# Patient Record
Sex: MALE | URBAN - METROPOLITAN AREA
[De-identification: names, ages, dates, MRNs, and addresses within clinical notes are randomized per-mention and may not be internally consistent; named-entity substitution may affect disease eponyms.]

---

## 2017-01-06 ENCOUNTER — ANESTHESIA EVENT (OUTPATIENT)
Dept: SURGERY | Facility: CLINIC | Age: 67
End: 2017-01-06
Payer: COMMERCIAL

## 2017-01-06 ENCOUNTER — ANESTHESIA (OUTPATIENT)
Dept: SURGERY | Facility: CLINIC | Age: 67
End: 2017-01-06
Payer: COMMERCIAL

## 2017-01-06 ENCOUNTER — APPOINTMENT (OUTPATIENT)
Dept: SURGERY | Facility: PHYSICIAN GROUP | Age: 67
End: 2017-01-06
Payer: COMMERCIAL

## 2017-01-06 PROCEDURE — 49568 ZZHC REPAIR HERNIA WITH MESH: CPT | Performed by: SURGERY

## 2017-01-06 PROCEDURE — 49560 ZZHC REPAIR INCISIONAL HERNIA,REDUCIBLE: CPT | Performed by: SURGERY

## 2017-01-06 PROCEDURE — 25000125 ZZHC RX 250: Performed by: NURSE ANESTHETIST, CERTIFIED REGISTERED

## 2017-01-06 PROCEDURE — 25000125 ZZHC RX 250: Performed by: SURGERY

## 2017-01-06 PROCEDURE — 25800025 ZZH RX 258: Performed by: NURSE ANESTHETIST, CERTIFIED REGISTERED

## 2017-01-06 RX ORDER — PROPOFOL 10 MG/ML
INJECTION, EMULSION INTRAVENOUS PRN
Status: DISCONTINUED | OUTPATIENT
Start: 2017-01-06 | End: 2017-01-06

## 2017-01-06 RX ORDER — SODIUM CHLORIDE, SODIUM LACTATE, POTASSIUM CHLORIDE, CALCIUM CHLORIDE 600; 310; 30; 20 MG/100ML; MG/100ML; MG/100ML; MG/100ML
INJECTION, SOLUTION INTRAVENOUS CONTINUOUS PRN
Status: DISCONTINUED | OUTPATIENT
Start: 2017-01-06 | End: 2017-01-06

## 2017-01-06 RX ORDER — FENTANYL CITRATE 50 UG/ML
INJECTION, SOLUTION INTRAMUSCULAR; INTRAVENOUS PRN
Status: DISCONTINUED | OUTPATIENT
Start: 2017-01-06 | End: 2017-01-06

## 2017-01-06 RX ORDER — ONDANSETRON 2 MG/ML
INJECTION INTRAMUSCULAR; INTRAVENOUS PRN
Status: DISCONTINUED | OUTPATIENT
Start: 2017-01-06 | End: 2017-01-06

## 2017-01-06 RX ORDER — KETOROLAC TROMETHAMINE 30 MG/ML
INJECTION, SOLUTION INTRAMUSCULAR; INTRAVENOUS PRN
Status: DISCONTINUED | OUTPATIENT
Start: 2017-01-06 | End: 2017-01-06

## 2017-01-06 RX ORDER — NEOSTIGMINE METHYLSULFATE 1 MG/ML
VIAL (ML) INJECTION PRN
Status: DISCONTINUED | OUTPATIENT
Start: 2017-01-06 | End: 2017-01-06

## 2017-01-06 RX ORDER — DEXAMETHASONE SODIUM PHOSPHATE 4 MG/ML
INJECTION, SOLUTION INTRA-ARTICULAR; INTRALESIONAL; INTRAMUSCULAR; INTRAVENOUS; SOFT TISSUE PRN
Status: DISCONTINUED | OUTPATIENT
Start: 2017-01-06 | End: 2017-01-06

## 2017-01-06 RX ORDER — LIDOCAINE HYDROCHLORIDE 10 MG/ML
INJECTION, SOLUTION INFILTRATION; PERINEURAL PRN
Status: DISCONTINUED | OUTPATIENT
Start: 2017-01-06 | End: 2017-01-06

## 2017-01-06 RX ORDER — GLYCOPYRROLATE 0.2 MG/ML
INJECTION, SOLUTION INTRAMUSCULAR; INTRAVENOUS PRN
Status: DISCONTINUED | OUTPATIENT
Start: 2017-01-06 | End: 2017-01-06

## 2017-01-06 RX ADMIN — GLYCOPYRROLATE 0.3 MG: 0.2 INJECTION, SOLUTION INTRAMUSCULAR; INTRAVENOUS at 13:01

## 2017-01-06 RX ADMIN — SODIUM CHLORIDE, POTASSIUM CHLORIDE, SODIUM LACTATE AND CALCIUM CHLORIDE: 600; 310; 30; 20 INJECTION, SOLUTION INTRAVENOUS at 12:05

## 2017-01-06 RX ADMIN — ROCURONIUM BROMIDE 40 MG: 10 INJECTION INTRAVENOUS at 12:12

## 2017-01-06 RX ADMIN — Medication 2 MG: at 13:01

## 2017-01-06 RX ADMIN — PROPOFOL 200 MG: 10 INJECTION, EMULSION INTRAVENOUS at 12:12

## 2017-01-06 RX ADMIN — FENTANYL CITRATE 150 MCG: 50 INJECTION, SOLUTION INTRAMUSCULAR; INTRAVENOUS at 12:12

## 2017-01-06 RX ADMIN — ONDANSETRON 4 MG: 2 INJECTION INTRAMUSCULAR; INTRAVENOUS at 13:01

## 2017-01-06 RX ADMIN — KETOROLAC TROMETHAMINE 30 MG: 30 INJECTION, SOLUTION INTRAMUSCULAR at 13:01

## 2017-01-06 RX ADMIN — MIDAZOLAM HYDROCHLORIDE 2 MG: 1 INJECTION, SOLUTION INTRAMUSCULAR; INTRAVENOUS at 12:07

## 2017-01-06 RX ADMIN — DEXAMETHASONE SODIUM PHOSPHATE 4 MG: 4 INJECTION, SOLUTION INTRAMUSCULAR; INTRAVENOUS at 12:12

## 2017-01-06 RX ADMIN — LIDOCAINE HYDROCHLORIDE 30 MG: 10 INJECTION, SOLUTION INFILTRATION; PERINEURAL at 12:12

## 2017-01-06 RX ADMIN — CEFAZOLIN SODIUM 2 G: 2 INJECTION, SOLUTION INTRAVENOUS at 12:05

## 2017-01-06 NOTE — ANESTHESIA POSTPROCEDURE EVALUATION
Patient: Facundo Diaz    HERNIORRHAPHY VENTRAL (N/A Abdomen)  Additional InformationProcedure(s):  Ventral Hernia Repair with mesh  - Wound Class: I-Clean    Diagnosis:Ventral Hernia   Diagnosis Additional Information: Pre-operative diagnosis:   Ventral Hernia    Post-operative diagnosis:  same   Procedure:  Ventral Herniorrhaphy with Mesh             Anesthesia Type:  General, ETT    Note:  Anesthesia Post Evaluation    Patient location during evaluation: PACU  Patient participation: Able to fully participate in evaluation  Level of consciousness: awake and alert  Pain management: adequate  Airway patency: patent  Cardiovascular status: acceptable  Respiratory status: acceptable  Hydration status: acceptable  PONV: none     Anesthetic complications: None          Last vitals:  Filed Vitals:    01/06/17 1345 01/06/17 1357 01/06/17 1410   BP: 114/67 115/69 134/72   Pulse:      Temp:  97.3  F (36.3  C)    Resp: 13 12 16   SpO2: 98% 96% 94%       Electronically Signed By: Obi Saini MD  January 6, 2017  2:16 PM

## 2017-01-06 NOTE — ANESTHESIA CARE TRANSFER NOTE
Patient: Facundo Diaz    HERNIORRHAPHY VENTRAL (N/A Abdomen)  Additional InformationProcedure(s):  Ventral Hernia Repair with mesh  - Wound Class: I-Clean    Diagnosis: Ventral Hernia   Diagnosis Additional Information: No value filed.    Anesthesia Type:   General, ETT     Note:  Airway :Face Mask  Patient transferred to:PACU  Comments: To PACU, Awake, VSS, SV, O2, Report to RN      Vitals: (Last set prior to Anesthesia Care Transfer)              Electronically Signed By: Dean Dennis Severson, APRN CRNA  January 6, 2017  1:12 PM

## 2017-01-06 NOTE — ANESTHESIA PREPROCEDURE EVALUATION
Anesthesia Evaluation     . Pt has had prior anesthetic. Type: General    No history of anesthetic complications     ROS/MED HX    ENT/Pulmonary:  - neg pulmonary ROS     Neurologic:  - neg neurologic ROS     Cardiovascular:  - neg cardiovascular ROS       METS/Exercise Tolerance:     Hematologic: Comments: Lab Test        12/05/16                       0703          HGB          14.7           Lab Test        12/05/16                       0703          NA           140           POTASSIUM    4.1           CHLORIDE     105           CO2          29            BUN          17            CR           1.23          ANIONGAP     6             YVON          9.0           GLC          101*                    Musculoskeletal:  - neg musculoskeletal ROS       GI/Hepatic:     (+) GERD Symptomatic,       Renal/Genitourinary:  - ROS Renal section negative       Endo:  - neg endo ROS       Psychiatric:  - neg psychiatric ROS       Infectious Disease:  - neg infectious disease ROS       Malignancy:         Other:    - neg other ROS           Physical Exam  Normal systems: cardiovascular, pulmonary and dental    Airway   Mallampati: II  TM distance: >3 FB  Neck ROM: full    Dental     Cardiovascular       Pulmonary                     Anesthesia Plan      History & Physical Review  History and physical reviewed and following examination; no interval change.    ASA Status:  2 .    NPO Status:  > 8 hours    Plan for General and ETT with Intravenous induction. Maintenance will be Balanced.    PONV prophylaxis:  Ondansetron (or other 5HT-3) and Dexamethasone or Solumedrol       Postoperative Care  Postoperative pain management:  IV analgesics and Oral pain medications.      Consents  Anesthetic plan, risks, benefits and alternatives discussed with:  Patient..                          .

## 2017-02-08 ENCOUNTER — OFFICE VISIT (OUTPATIENT)
Dept: SURGERY | Facility: CLINIC | Age: 67
End: 2017-02-08
Payer: COMMERCIAL

## 2017-02-08 VITALS
SYSTOLIC BLOOD PRESSURE: 122 MMHG | OXYGEN SATURATION: 96 % | HEIGHT: 72 IN | TEMPERATURE: 97.7 F | BODY MASS INDEX: 26.41 KG/M2 | HEART RATE: 91 BPM | WEIGHT: 195 LBS | DIASTOLIC BLOOD PRESSURE: 82 MMHG

## 2017-02-08 DIAGNOSIS — Z09 SURGICAL FOLLOWUP VISIT: Primary | ICD-10-CM

## 2017-02-08 PROCEDURE — 99024 POSTOP FOLLOW-UP VISIT: CPT | Performed by: PHYSICIAN ASSISTANT

## 2017-02-08 NOTE — PROGRESS NOTES
2/8/2017    [facility] Note     Subjective:  Facundo Diaz is here for his first postoperative visit. He underwent a ventral hernia repair with mesh by Dr. Burgess on 1/6/2017. Today he  tells me he has been feeling well since surgery. He currently does not require narcotic pain medications, he is eating a normal diet and his bowels are regular. He has no concerns today.    Objective:  Abd - soft, non-tender  Inc - Healing well, well approximated and without signs of infection    Assessment:  S/p ventral hernia repair with mesh.     Plan:  Advised to return to physical activity slowly  RTC PRN      Lucy Albarado PA-C      Please route or send letter to:  Primary Care Provider (PCP)

## 2017-02-08 NOTE — LETTER
2017     [facility] Note     RE: Facundo Diaz-:  [birth year]     Subjective:  Facundo Diaz is here for his first postoperative visit. He underwent a ventral hernia repair with mesh by Dr. Burgess on 2017. Today he tells me he has been feeling well since surgery. He currently does not require narcotic pain medications, he is eating a normal diet and his bowels are regular. He has no concerns today.     Objective:  Abd - soft, non-tender  Inc - Healing well, well approximated and without signs of infection     Assessment:  S/p ventral hernia repair with mesh.      Plan:  Advised to return to physical activity slowly  RTC PRN      Lucy Albarado PA-C

## 2017-02-15 ENCOUNTER — TRANSFERRED RECORDS (OUTPATIENT)
Dept: HEALTH INFORMATION MANAGEMENT | Facility: CLINIC | Age: 67
End: 2017-02-15

## 2017-05-05 DIAGNOSIS — E78.5 HYPERLIPIDEMIA LDL GOAL <160: ICD-10-CM

## 2017-05-05 LAB
ALBUMIN SERPL-MCNC: 3.9 G/DL (ref 3.4–5)
ALP SERPL-CCNC: 51 U/L (ref 40–150)
ALT SERPL W P-5'-P-CCNC: 19 U/L (ref 0–70)
AST SERPL W P-5'-P-CCNC: 19 U/L (ref 0–45)
BILIRUB DIRECT SERPL-MCNC: 0.1 MG/DL (ref 0–0.2)
BILIRUB SERPL-MCNC: 0.7 MG/DL (ref 0.2–1.3)
CHOLEST SERPL-MCNC: 232 MG/DL
HDLC SERPL-MCNC: 41 MG/DL
LDLC SERPL CALC-MCNC: 157 MG/DL
NONHDLC SERPL-MCNC: 191 MG/DL
PROT SERPL-MCNC: 7.9 G/DL (ref 6.8–8.8)
TRIGL SERPL-MCNC: 172 MG/DL

## 2017-05-05 PROCEDURE — 36415 COLL VENOUS BLD VENIPUNCTURE: CPT | Performed by: INTERNAL MEDICINE

## 2017-05-05 PROCEDURE — 80076 HEPATIC FUNCTION PANEL: CPT | Performed by: INTERNAL MEDICINE

## 2017-05-05 PROCEDURE — 80061 LIPID PANEL: CPT | Performed by: INTERNAL MEDICINE

## 2017-05-10 ENCOUNTER — OFFICE VISIT (OUTPATIENT)
Dept: INTERNAL MEDICINE | Facility: CLINIC | Age: 67
End: 2017-05-10
Payer: COMMERCIAL

## 2017-05-10 VITALS
WEIGHT: 193.9 LBS | HEIGHT: 72 IN | DIASTOLIC BLOOD PRESSURE: 68 MMHG | OXYGEN SATURATION: 96 % | HEART RATE: 78 BPM | RESPIRATION RATE: 18 BRPM | SYSTOLIC BLOOD PRESSURE: 110 MMHG | TEMPERATURE: 98.5 F | BODY MASS INDEX: 26.26 KG/M2

## 2017-05-10 DIAGNOSIS — Z23 NEED FOR VACCINATION: ICD-10-CM

## 2017-05-10 DIAGNOSIS — M42.00 SCHEURMANN'S DISEASE: ICD-10-CM

## 2017-05-10 DIAGNOSIS — E78.5 HYPERLIPIDEMIA LDL GOAL <160: Primary | ICD-10-CM

## 2017-05-10 DIAGNOSIS — N52.9 ERECTILE DYSFUNCTION, UNSPECIFIED ERECTILE DYSFUNCTION TYPE: ICD-10-CM

## 2017-05-10 DIAGNOSIS — Z12.5 SPECIAL SCREENING FOR MALIGNANT NEOPLASM OF PROSTATE: ICD-10-CM

## 2017-05-10 PROCEDURE — 90471 IMMUNIZATION ADMIN: CPT | Performed by: INTERNAL MEDICINE

## 2017-05-10 PROCEDURE — 99214 OFFICE O/P EST MOD 30 MIN: CPT | Mod: 25 | Performed by: INTERNAL MEDICINE

## 2017-05-10 PROCEDURE — 90670 PCV13 VACCINE IM: CPT | Performed by: INTERNAL MEDICINE

## 2017-05-10 PROCEDURE — G0009 ADMIN PNEUMOCOCCAL VACCINE: HCPCS | Mod: 59 | Performed by: INTERNAL MEDICINE

## 2017-05-10 PROCEDURE — 90714 TD VACC NO PRESV 7 YRS+ IM: CPT | Performed by: INTERNAL MEDICINE

## 2017-05-10 RX ORDER — SIMVASTATIN 20 MG
20 TABLET ORAL AT BEDTIME
Qty: 90 TABLET | Refills: 3 | Status: SHIPPED | OUTPATIENT
Start: 2017-05-10

## 2017-05-10 RX ORDER — SILDENAFIL 100 MG/1
50-100 TABLET, FILM COATED ORAL DAILY PRN
Qty: 12 TABLET | Refills: 11 | Status: SHIPPED | OUTPATIENT
Start: 2017-05-10

## 2017-05-10 NOTE — PROGRESS NOTES
SUBJECTIVE:                                                    Facundo Diaz is a 66 year old male who presents to clinic today for the following health issues:    Prevnar- DUE  TD- DUE    Back Pain      Duration:         Specific cause: none    Description:   Location of pain: low back left, middle of back left and neck both  Character of pain: dull ache  Pain radiation:none  New numbness or weakness in legs, not attributed to pain:  no     Intensity: Currently 0/10 but can be 4/10 sometimes    History:   Pain interferes with job: YES, hunches over alot  History of back problems: scheurmans disease  Any previous MRI or X-rays: None  Sees a specialist for back pain:  No  Therapies tried without relief: exercise    Alleviating factors:     Improved by: ibuprofen     Precipitating factors:  Worsened by: Standing, hunching over doing his job    Functional and Psychosocial Screen (Leonarda STarT Back):      Not performed today       Accompanying Signs & Symptoms:  Risk of Fracture:  None  Risk of Cauda Equina:  None  Risk of Infection:  None  Risk of Cancer:  None  Risk of Ankylosing Spondylitis:  Onset at age <35, male, AND morning back stiffness. no                      Problem list and histories reviewed & adjusted, as indicated.  Additional history: as documented    Patient Active Problem List   Diagnosis     Hyperlipidemia LDL goal <160     Erectile dysfunction     Scheurmann's disease     Umbilical hernia     Gastroesophageal reflux disease without esophagitis     Past Surgical History:   Procedure Laterality Date     HERNIORRHAPHY VENTRAL N/A 1/6/2017    Procedure: HERNIORRHAPHY VENTRAL;  Surgeon: Parker Burgess MD;  Location: [facility] OR       Social History   Substance Use Topics     Smoking status: Never Smoker     Smokeless tobacco: Not on file     Alcohol use Yes      Comment: Social Drinker      Family History   Problem Relation Age of Onset     CANCER Mother 84     Lipids Mother      CANCER Father 90      Pancreas     Musculoskeletal Disorder Father      back issues         Current Outpatient Prescriptions   Medication Sig Dispense Refill     oxyCODONE (ROXICODONE) 5 MG IR tablet Take 1-2 tablets (5-10 mg) by mouth every 3 hours as needed for pain or other (Moderate to Severe) 30 tablet 0     Sildenafil Citrate (VIAGRA PO)        Multiple Vitamin (MULTIVITAMINS PO)        No Known Allergies  BP Readings from Last 3 Encounters:   02/08/17 122/82   01/06/17 130/65   12/30/16 124/78    Wt Readings from Last 3 Encounters:   02/08/17 195 lb (88.5 kg)   01/06/17 195 lb 1.7 oz (88.5 kg)   12/30/16 198 lb (89.8 kg)            Reviewed and updated as needed this visit by clinical staff  Tobacco  Allergies  Med Hx  Surg Hx  Fam Hx  Soc Hx      Reviewed and updated as needed this visit by Provider       ROS:  C: NEGATIVE for fever, chills, change in weight  E/M: NEGATIVE for ear, mouth and throat problems  R: NEGATIVE for significant cough or SOB  CV: NEGATIVE for chest pain, palpitations or peripheral edema  GI: NEGATIVE for nausea, abdominal pain, heartburn, or change in bowel habits  : NEGATIVE for frequency, dysuria, or hematuria  H: NEGATIVE for bleeding problems  P: NEGATIVE for changes in mood or affect    OBJECTIVE:                                                    /68  Pulse 78  Temp 98.5  F (36.9  C) (Oral)  Resp 18  Ht 6' (1.829 m)  Wt 193 lb 14.4 oz (88 kg)  SpO2 96%  BMI 26.3 kg/m2  Body mass index is 26.3 kg/(m^2).  GENERAL: healthy, alert and no distress  EYES: Eyes grossly normal to inspection, extraocular movements - intact, and PERRL  HENT: ear canals- normal; TMs- normal; Nose- normal; Mouth- no ulcers, no lesions  NECK: no tenderness, no adenopathy, no asymmetry, no masses, no stiffness; thyroid- normal to palpation  RESP: lungs clear to auscultation - no rales, no rhonchi, no wheezes  CV: regular rates and rhythm, normal S1 S2, no S3 or S4 and no click or rub   MS: extremities- no gross  deformities noted, no edema  NEURO: no focal changes  BACK: no CVA tenderness, mild paralumbar tenderness  PSYCH: Alert and oriented times 3; speech- coherent , normal rate and volume; able to articulate logical thoughts, able to abstract reason, no tangential thoughts, no hallucinations or delusions, affect- normal       ASSESSMENT/PLAN:                                                      (E78.5) Hyperlipidemia LDL goal <160  (primary encounter diagnosis)  Comment: offered to start therapy  Plan: simvastatin (ZOCOR) 20 MG tablet, Hepatic         panel, Lipid Profile        After discussion of the treatment of hyperlipidemia, a statin-drug is chosen; prescription for Zocor once daily is written. The patient is informed that this type of drug is usually highly effective to lower LDL cholesterol and is usually very well tolerated. However, statin-type drugs can potentially injure the liver. Blood tests will be required every 3 months for the first 6 months of therapy, and at 6-12 month intervals thereafter.  Damage to the liver, if detected early, can be reversed by stopping the drug.  Be alert for persistent nausea, abdominal pain, or yellow jaundice, and report such to me directly. Statin drugs may also cause skeletal muscle injury in rare cases. Be alert for pronounced persistent diffuse muscle pain and discontinue the drug immediately should such symptoms develop. Grapefruit juice may increase the blood levels and side effects of some HMG Co-A reductase inhibitors (Zocor, Lipitor and Mevacor but not Pravachol or Lescol). Patient is counseled in this regard.      (M42.00) Scheurmann's disease  Comment: start PT consider referral  Plan: [facility], AND CHIROPRACTIC REFERRAL,         ORTHOPEDICS ADULT REFERRAL            (Z23) Need for vaccination  Comment: updated  Plan: C TD PRSERV FREE >=7 YRS ADS IM, PNEUMOCOCCAL         CONJ VACCINE 13 VALENT IM, VACCINE         ADMINISTRATION, INITIAL            (Z12.5)  Special screening for malignant neoplasm of prostate  Comment: as ordered repeat in 3 months screening  Plan: PSA, screen            (N52.9) Erectile dysfunction, unspecified erectile dysfunction type  Comment: start therapy  Plan: sildenafil (VIAGRA) 100 MG cap/tab        The patient desires Viagra to treat his erectile dysfunction. History and physical exam has not disclosed any obvious treatable cause of this complaint. He is informed that Viagra is usually not covered by insurance. It is available on a fee-for-service cost basis, and is relatively expensive. He can start with 50 mg dose, and increase to 100 mg if necessary. The method of use 1 hour prior to anticipated intercourse is explained. He should not use any more than one tablet in a 24 hour period. The side effects of possible headache, flushing, dyspepsia and transient changes in vision have been explained. Samples are not given.    The patient is not taking nitrates, and denies he has access to nitrates in any form at any time. I have counseled him that taking Viagra with nitrates of any form can cause death. Additionally, Viagra serum concentrations can be increased by the following: cimetidine, erythromycin, itraconazole or ketoconazole. This patient does not take these drugs, but I have counseled him to avoid Viagra if he does take any of these.    We have also discussed the fact that there have been some deaths in patients after taking Viagra, felt due to the exertion of intercourse rather than the drug itself. The patient is aware of this, and accepts whatever unknown degree of risk there is in this aspect.    See Patient Instructions    Emery Mallory MD  [facility]    25 minutes spent with this patient, face to face, discussing treatment options for listed problems above as well as side effects of appropriate medications.  Counseling time extended beyond 50% of the clinic visit.  Medication dosing, treatment plan and  follow-up were discussed. Also reviewed need for primary care testing for patient.

## 2017-05-10 NOTE — MR AVS SNAPSHOT
After Visit Summary   5/10/2017    Facundo Diaz    MRN: [id number]           Patient Information     Date Of Birth          1950        Visit Information        Provider Department      5/10/2017 1:40 PM Emery Mallory MD [facility]        Today's Diagnoses     Hyperlipidemia LDL goal <160    -  1    Scheurmann's disease        Need for vaccination        Special screening for malignant neoplasm of prostate        Erectile dysfunction, unspecified erectile dysfunction type           Follow-ups after your visit        Additional Services     [facility] REFERRAL       **This order will print in the [facility] Scheduling Office**    Physical Therapy, Hand Therapy and Chiropractic Care are available through:    *[facility]  *[facility]  *[facility]    Call one number to schedule at any of the above locations: [phone].    Your provider has referred you to: Physical Therapy at [facility] or [facility] [place] [place]    Indication/Reason for Referral:  Back Pain  Onset of Illness: weeks+  Therapy Orders: Evaluate and Treat  Special Programs: None  Special Request: None    Leonarda Brower      Additional Comments for the Therapist or Chiropractor:     Please be aware that coverage of these services is subject to the terms and limitations of your health insurance plan.  Call member services at your health plan with any benefit or coverage questions.      Please bring the following to your appointment:    *Your personal calendar for scheduling future appointments  *Comfortable clothing            ORTHOPEDICS ADULT REFERRAL       Your provider has referred you to: [facility] [place] [phone]   [email or web address]      Please be aware that coverage of these services is subject to the terms and limitations of your health insurance plan.  Call member services at your health plan with any  benefit or coverage questions.      Please bring the following to your appointment:    >>   Any x-rays, CTs or MRIs which have been performed.  Contact the facility where they were done to arrange for  prior to your scheduled appointment.    >>   List of current medications   >>   This referral request   >>   Any documents/labs given to you for this referral                  Future tests that were ordered for you today     Open Future Orders        Priority Expected Expires Ordered    Hepatic panel Routine 8/1/2017 8/31/2017 5/10/2017    Lipid Profile Routine 8/1/2017 8/31/2017 5/10/2017    PSA, screen Routine 8/1/2017 8/31/2017 5/10/2017            Who to contact     If you have questions or need follow up information about today's clinic visit or your schedule please contact [facility] directly at [phone].  Normal or non-critical lab and imaging results will be communicated to you by MyChart, letter or phone within 4 business days after the clinic has received the results. If you do not hear from us within 7 days, please contact the clinic through [facility] or phone. If you have a critical or abnormal lab result, we will notify you by phone as soon as possible.  Submit refill requests through [email or web address] or call your pharmacy and they will forward the refill request to us. Please allow 3 business days for your refill to be completed.          Additional Information About Your Visit        [facility] Information     [email or web address] gives you secure access to your electronic health record. If you see a primary care provider, you can also send messages to your care team and make appointments. If you have questions, please call your primary care clinic.  If you do not have a primary care provider, please call [phone] and they will assist you.        Care EveryWhere ID     This is your Care EveryWhere ID. This could be used by other organizations to access your [facility]  records  [id number]        Your Vitals Were     Pulse Temperature Respirations Height Pulse Oximetry BMI (Body Mass Index)    78 98.5  F (36.9  C) (Oral) 18 6' (1.829 m) 96% 26.3 kg/m2       Blood Pressure from Last 3 Encounters:   05/10/17 110/68   02/08/17 122/82   01/06/17 130/65    Weight from Last 3 Encounters:   05/10/17 193 lb 14.4 oz (88 kg)   02/08/17 195 lb (88.5 kg)   01/06/17 195 lb 1.7 oz (88.5 kg)              We Performed the Following     C TD PRSERV FREE >=7 YRS ADS IM     [facility], HAND, AND CHIROPRACTIC REFERRAL     ORTHOPEDICS ADULT REFERRAL     PNEUMOCOCCAL CONJ VACCINE 13 VALENT IM     VACCINE ADMINISTRATION, INITIAL          Today's Medication Changes          These changes are accurate as of: 5/10/17  2:05 PM.  If you have any questions, ask your nurse or doctor.               Start taking these medicines.        Dose/Directions    sildenafil 100 MG cap/tab   Commonly known as:  VIAGRA   Used for:  Erectile dysfunction, unspecified erectile dysfunction type   Started by:  Emery Mallory MD        Dose:   mg   Take 0.5-1 tablets ( mg) by mouth daily as needed for erectile dysfunction Take 30 min to 4 hours before intercourse.  Never use with nitroglycerin, terazosin or doxazosin.   Quantity:  12 tablet   Refills:  11       simvastatin 20 MG tablet   Commonly known as:  ZOCOR   Used for:  Hyperlipidemia LDL goal <160   Started by:  Emery Mallory MD        Dose:  20 mg   Take 1 tablet (20 mg) by mouth At Bedtime   Quantity:  90 tablet   Refills:  3            Where to get your medicines      Some of these will need a paper prescription and others can be bought over the counter.  Ask your nurse if you have questions.     Bring a paper prescription for each of these medications     sildenafil 100 MG cap/tab    simvastatin 20 MG tablet                Primary Care Provider Office Phone # Fax #    Emery Mallory [phone]       [facility] [address]  [facility] [address]        Thank you!     Thank you for choosing [facility]  for your care. Our goal is always to provide you with excellent care. Hearing back from our patients is one way we can continue to improve our services. Please take a few minutes to complete the written survey that you may receive in the mail after your visit with us. Thank you!             Your Updated Medication List - Protect others around you: Learn how to safely use, store and throw away your medicines at www.disposemymeds.org.          This list is accurate as of: 5/10/17  2:05 PM.  Always use your most recent med list.                   Brand Name Dispense Instructions for use    MULTIVITAMINS PO          oxyCODONE 5 MG IR tablet    ROXICODONE    30 tablet    Take 1-2 tablets (5-10 mg) by mouth every 3 hours as needed for pain or other (Moderate to Severe)       sildenafil 100 MG cap/tab    VIAGRA    12 tablet    Take 0.5-1 tablets ( mg) by mouth daily as needed for erectile dysfunction Take 30 min to 4 hours before intercourse.  Never use with nitroglycerin, terazosin or doxazosin.       simvastatin 20 MG tablet    ZOCOR    90 tablet    Take 1 tablet (20 mg) by mouth At Bedtime

## 2017-05-10 NOTE — NURSING NOTE
Chief Complaint   Patient presents with     Back Pain       Initial /68  Pulse 78  Temp 98.5  F (36.9  C) (Oral)  Resp 18  Ht 6' (1.829 m)  Wt 193 lb 14.4 oz (88 kg)  SpO2 96%  BMI 26.3 kg/m2 Estimated body mass index is 26.3 kg/(m^2) as calculated from the following:    Height as of this encounter: 6' (1.829 m).    Weight as of this encounter: 193 lb 14.4 oz (88 kg).  Medication Reconciliation: complete   Monica Manuel MA     Screening Questionnaire for Adult Immunization    Are you sick today?   No   Do you have allergies to medications, food, a vaccine component or latex?   No   Have you ever had a serious reaction after receiving a vaccination?   No   Do you have a long-term health problem with heart disease, lung disease, asthma, kidney disease, metabolic disease (e.g. diabetes), anemia, or other blood disorder?   No   Do you have cancer, leukemia, HIV/AIDS, or any other immune system problem?   No   In the past 3 months, have you taken medications that affect  your immune system, such as prednisone, other steroids, or anticancer drugs; drugs for the treatment of rheumatoid arthritis, Crohn s disease, or psoriasis; or have you had radiation treatments?   No   Have you had a seizure, or a brain or other nervous system problem?   No   During the past year, have you received a transfusion of blood or blood     products, or been given immune (gamma) globulin or antiviral drug?   No   For women: Are you pregnant or is there a chance you could become        pregnant during the next month?   No   Have you received any vaccinations in the past 4 weeks?   No     Immunization questionnaire answers were all negative.      MNVFC doesn't apply on this patient    Per orders of Dr. Mallory, injection of Prevnar 13 and TD given by Monica Manuel. Patient instructed to remain in clinic for 20 minutes afterwards, and to report any adverse reaction to me immediately.       Screening performed by Monica Manuel on  5/10/2017 at 1:49 PM.

## 2017-11-29 ENCOUNTER — THERAPY VISIT (OUTPATIENT)
Dept: PHYSICAL THERAPY | Facility: CLINIC | Age: 67
End: 2017-11-29
Payer: COMMERCIAL

## 2017-11-29 DIAGNOSIS — M54.50 ACUTE BILATERAL LOW BACK PAIN WITHOUT SCIATICA: ICD-10-CM

## 2017-11-29 DIAGNOSIS — M54.2 NECK PAIN ON LEFT SIDE: Primary | ICD-10-CM

## 2017-11-29 PROCEDURE — G8981 BODY POS CURRENT STATUS: HCPCS | Mod: GP | Performed by: PHYSICAL THERAPIST

## 2017-11-29 PROCEDURE — 97161 PT EVAL LOW COMPLEX 20 MIN: CPT | Mod: GP | Performed by: PHYSICAL THERAPIST

## 2017-11-29 PROCEDURE — G8982 BODY POS GOAL STATUS: HCPCS | Mod: GP | Performed by: PHYSICAL THERAPIST

## 2017-11-29 PROCEDURE — 97110 THERAPEUTIC EXERCISES: CPT | Mod: GP | Performed by: PHYSICAL THERAPIST

## 2017-11-29 NOTE — PROGRESS NOTES
"Chief Complaint   Patient presents with     Eye Problem       Initial /77  Pulse 87  Temp 98.5  F (36.9  C) (Tympanic)  Wt 280 lb (127 kg)  SpO2 97%  BMI 41.35 kg/m2 Estimated body mass index is 41.35 kg/(m^2) as calculated from the following:    Height as of 4/18/17: 5' 9\" (1.753 m).    Weight as of this encounter: 280 lb (127 kg).  Medication Reconciliation: complete       CHARLIE Flores      " [facility] Initial Evaluation    Subjective:    Patient is a 67 year old male presenting with rehab cervical spine hpi and rehab back hpi. The history is provided by the patient. No  was used.   Facundo Diaz is a 67 year old male with a cervical spine condition.  Condition occurred with:  Insidious onset.  Condition occurred: for unknown reasons.  This is a new condition  Onset of B neck and UT pain, L>R approximately 10-15-17 for unknown reasons. He has difficulty getting comfortable in bed, losing 2 hours sleep. 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 Looking over shoulders is painful. 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 He states afterward he was able to participates in sports without limitations. .    Patient reports pain:  Cervical right side and cervical left side.  Radiates to:  Shoulder right and shoulder left.  Pain is described as aching and is constant and reported as 1/10 and 4/10.  Associated symptoms:  Tingling (just today noted some tingling in left arm--this is not typical). Pain is worse during the day and worse in the P.M..  Symptoms are exacerbated by rotating head, looking up or down, lying down and driving and relieved by heat.  Since onset symptoms are gradually worsening.        General health as reported by patient is excellent.                            Facundo Diaz is a 67 year old male with a lumbar condition.  Condition occurred with:  Other reason.  Condition occurred: other.  This is a recurrent condition  He was diagnosed with Scheuermann's at age 14 and was in body cast for 6 months. He states afterward he was able to participates in sports without limitations. He has had LBP on/off over the years. He had mid and LBP since then. This current episode began May 2017 for unknown reasons. Reports B LBP, shifts from side to side. Over  past month his mid and low back has not been as bothersome. It gets sore with his working over his patients, sometimes hard to straighten up. His CC right now is his L neck/UT pain though.    Patient reports pain:  Mid thoracic spine, lumbar spine right and lumbar spine left.    Pain is described as aching and is intermittent and reported as 1/10 and 3/10.   Pain is worse during the day.  Symptoms are exacerbated by bending, sitting and standing and relieved by rest.  Since onset symptoms are unchanged.        General health as reported by patient is excellent.                                              Objective:    System         Lumbar/SI Evaluation  ROM:    AROM Lumbar:   Flexion:          Hands to mid lower leg  Ext:                    50%   Side Bend:        Left:     Right:   Rotation:           Left:     Right:   Side Glide:        Left:  100%    Right:  100%      AROM Thoracic:  Flex:               100%  Ext:                10% (just past neutral)  Rotation:        Left:  100%    Right:  100%                              Cervical/Thoracic Evaluation    AROM:  AROM Cervical:    Flexion:            100%  Extension:       75%  Rotation:         Left: 75% (L neck pain)     Right: 90%  Side Bend:      Left: 33% (L neck pain)     Right:  33% (L neck pain)      Headaches: none                                                            Kahlil Cervical Evaluation    Posture:  Sitting: fair  Standing: fair  Protruding Head: yes  Wry Neck: no    Other Observations: mild increased kyphosis  Movement Loss:    Flexion (Flex): nil    Extension (EXT): min  Lateral Flexion Right (LF R): major  Lateral Flexion Left (LF L): major and pain  Rotation Right (ROT R): min  Rotation Left (ROT L): min and pain  Test Movements:  Pretest Pain Sittin-3/10 L neck and UT pain    RET: During: increases    Repeat RET: During: increases  After: worse  Mechanical Response: no effect          Pretest Pain Sitting: repeated retraction  "increased pain from 2-3/10 to 4/10 and pain did not change any further with any motion    LF L: During: increases    Repeat LF L: During: increases  After: no worse  Mechanical Response: no effect    Rot L: During: increases    Repeat Rot L: During: increases  After: no worse  Mechanical Response: no effect  Flex: During: no effect    Repeat Flex: During: no effect   After: no effect   Mechanical Response: no effect    Static Tests:      Retraction: 60\"x3 --no change pain, increased motion      Principle of Treatment:  Posture Correction: yes    Extension: trial of sustained retraction 1'x3 every 2 hours                                             ROS    Assessment/Plan:      Patient is a 67 year old male with cervical and lumbar complaints.  He has Scheuermann's disease but he lacks the characteristic significant increased kyphosis, he has mild increased kyphosis. He does lack significant thoracic extension. He also has a significant loss of B cervical SB as well as pain rotating toward and bending toward the painful side. His CC is his L neck and UT pain, most uncomfortable in bed as well as working over his patients as an endodontist. He has LBP that shifts from right to left but has been fairly mild. Unable to establish a clear directional pattern for his L neck/UT sx's. He had no improvement in pain but had increased motion with sustained retraction. Trial every 2 hours with instructions to check his sx's and motion before and after and stop if worse. Did not have time to assess his directional preference motion for lumbar spine today.    Patient has the following significant findings with corresponding treatment plan.                Diagnosis 1:  L neck/UT pain/B LBP  Pain -  hot/cold therapy, manual therapy and directional preference exercise  Decreased ROM/flexibility - manual therapy and therapeutic exercise  Decreased function - therapeutic activities  Impaired posture - neuro re-education    Therapy " Evaluation Codes:   1) History comprised of:   Personal factors that impact the plan of care:      None.    Comorbidity factors that impact the plan of care are:      None.     Medications impacting care: None.  2) Examination of Body Systems comprised of:   Body structures and functions that impact the plan of care:      Cervical spine and Lumbar spine.   Activity limitations that impact the plan of care are:      Reading/Computer work, Sitting, Standing, Working, Sleeping and Laying down.  3) Clinical presentation characteristics are:   Stable/Uncomplicated.  4) Decision-Making    Low complexity using standardized patient assessment instrument and/or measureable assessment of functional outcome.  Cumulative Therapy Evaluation is: Low complexity.    Previous and current functional limitations:  (See Goal Flow Sheet for this information)    Short term and Long term goals: (See Goal Flow Sheet for this information)     Communication ability:  Patient appears to be able to clearly communicate and understand verbal and written communication and follow directions correctly.  Treatment Explanation - The following has been discussed with the patient:   RX ordered/plan of care  Anticipated outcomes  Possible risks and side effects  This patient would benefit from PT intervention to resume normal activities.   Rehab potential is good.    Frequency:  1 X week, once daily  Duration:  for 8 weeks  Discharge Plan:  Achieve all LTG.  Independent in home treatment program.  Reach maximal therapeutic benefit.    Please refer to the daily flowsheet for treatment today, total treatment time and time spent performing 1:1 timed codes.

## 2017-11-29 NOTE — MR AVS SNAPSHOT
After Visit Summary   11/29/2017    Facundo Diaz    MRN: [id number]           Patient Information     Date Of Birth          1950        Visit Information        Provider Department      11/29/2017 1:40 PM Mariela Lazo, PT [facility]        Today's Diagnoses     Neck pain on left side    -  1    Acute bilateral low back pain without sciatica           Follow-ups after your visit        Your next 10 appointments already scheduled     Dec 06, 2017  1:30 PM CST   ARIELLE Spine with Carlee Priest PT   [facility] ([facility]  )    [address] [facility] [address]  [facility] [address]   [phone]            Dec 13, 2017  2:10 PM CST   ARIELLE Spine with Carlee Priest PT   [facility] ([facility]  )    [address] [place] [address] [address]  [facility] [address]   [phone]              Who to contact     If you have questions or need follow up information about today's clinic visit or your schedule please contact [facility]AP[facility] directly at [phone].  Normal or non-critical lab and imaging results will be communicated to you by MyChart, letter or phone within 4 business days after the clinic has received the results. If you do not hear from us within 7 days, please contact the clinic through [employer] or phone. If you have a critical or abnormal lab result, we will notify you by phone as soon as possible.  Submit refill requests through [employer] or call your pharmacy and they will forward the refill request to us. Please allow 3 business days for your refill to be completed.          Additional Information About Your Visit        MyChart Information     [employer] gives you secure access to your electronic health record. If you see a primary care provider, you can also send messages to your care  team and make appointments. If you have questions, please call your primary care clinic.  If you do not have a primary care provider, please call [phone] and they will assist you.        Care EveryWhere ID     This is your Care EveryWhere ID. This could be used by other organizations to access your [facility] medical records  [id number]         Blood Pressure from Last 3 Encounters:   05/10/17 110/68   02/08/17 122/82   01/06/17 130/65    Weight from Last 3 Encounters:   05/10/17 88 kg (193 lb 14.4 oz)   02/08/17 88.5 kg (195 lb)   01/06/17 88.5 kg (195 lb 1.7 oz)              We Performed the Following     HC PT EVAL, LOW COMPLEXITY     ARIELLE INITIAL EVAL REPORT     THERAPEUTIC EXERCISES        Primary Care Provider Office Phone # Fax #    Emery Mallory [phone]       [address] [place] [id number]        Equal Access to Services     VANESSA DAVE : Hadii aad ku hadasho Soomaali, waaxda luqadaha, qaybta kaalmada adeegyada, waxay idiin hayaan anithaeg tameraarajoe allen . So [facility] [phone].    ATENCIÓN: Si habla español, tiene a moore disposición servicios gratuitos de asistencia lingüística. [facility] [phone].    We comply with applicable federal civil rights laws and [place] laws. We do not discriminate on the basis of race, color, national origin, age, disability, sex, sexual orientation, or gender identity.            Thank you!     Thank you for choosing [facility]  for your care. Our goal is always to provide you with excellent care. Hearing back from our patients is one way we can continue to improve our services. Please take a few minutes to complete the written survey that you may receive in the mail after your visit with us. Thank you!             Your Updated Medication List - Protect others around you: Learn how to safely use, store and throw away your medicines at www.disposemymeds.org.          This list is accurate  as of: 11/29/17  7:40 PM.  Always use your most recent med list.                   Brand Name Dispense Instructions for use Diagnosis    MULTIVITAMINS PO           oxyCODONE IR 5 MG tablet    ROXICODONE    30 tablet    Take 1-2 tablets (5-10 mg) by mouth every 3 hours as needed for pain or other (Moderate to Severe)    Ventral hernia without obstruction or gangrene       sildenafil 100 MG tablet    VIAGRA    12 tablet    Take 0.5-1 tablets ( mg) by mouth daily as needed for erectile dysfunction Take 30 min to 4 hours before intercourse.  Never use with nitroglycerin, terazosin or doxazosin.    Erectile dysfunction, unspecified erectile dysfunction type       simvastatin 20 MG tablet    ZOCOR    90 tablet    Take 1 tablet (20 mg) by mouth At Bedtime    Hyperlipidemia LDL goal <160

## 2017-11-30 NOTE — PROGRESS NOTES
Subjective:    Patient is a 67 year old male presenting with rehab left ankle/foot hpi.                                      Pertinent medical history includes:  Other (pain at night/rest, numbness/tingling).  Medical allergies: no.  Other surgeries include:  Other (Hernia).    Current occupation is Endodontist.    Primary job tasks include:  Prolonged sitting.              Oswestry Score: 18 %                 Objective:    System    Physical Exam    General     ROS    Assessment/Plan:

## 2017-12-06 ENCOUNTER — THERAPY VISIT (OUTPATIENT)
Dept: PHYSICAL THERAPY | Facility: CLINIC | Age: 67
End: 2017-12-06
Payer: COMMERCIAL

## 2017-12-06 DIAGNOSIS — M54.50 ACUTE BILATERAL LOW BACK PAIN WITHOUT SCIATICA: ICD-10-CM

## 2017-12-06 DIAGNOSIS — M54.2 NECK PAIN ON LEFT SIDE: ICD-10-CM

## 2017-12-06 PROCEDURE — 97110 THERAPEUTIC EXERCISES: CPT | Mod: GP | Performed by: PHYSICAL THERAPIST

## 2017-12-06 PROCEDURE — 97112 NEUROMUSCULAR REEDUCATION: CPT | Mod: GP | Performed by: PHYSICAL THERAPIST

## 2017-12-13 ENCOUNTER — THERAPY VISIT (OUTPATIENT)
Dept: PHYSICAL THERAPY | Facility: CLINIC | Age: 67
End: 2017-12-13
Payer: COMMERCIAL

## 2017-12-13 DIAGNOSIS — M54.50 ACUTE BILATERAL LOW BACK PAIN WITHOUT SCIATICA: ICD-10-CM

## 2017-12-13 DIAGNOSIS — M54.2 NECK PAIN ON LEFT SIDE: ICD-10-CM

## 2017-12-13 PROCEDURE — 97112 NEUROMUSCULAR REEDUCATION: CPT | Mod: GP | Performed by: PHYSICAL THERAPIST

## 2017-12-13 PROCEDURE — 97110 THERAPEUTIC EXERCISES: CPT | Mod: GP | Performed by: PHYSICAL THERAPIST

## 2017-12-13 NOTE — MR AVS SNAPSHOT
After Visit Summary   12/13/2017    Facundo Diaz    MRN: [id number]           Patient Information     Date Of Birth          1950        Visit Information        Provider Department      12/13/2017 2:50 PM Carlee Priest PT [facility]        Today's Diagnoses     Neck pain on left side        Acute bilateral low back pain without sciatica           Follow-ups after your visit        Who to contact     If you have questions or need follow up information about today's clinic visit or your schedule please contact [facility] PH[facility] THERAP[facility] directly at [phone].  Normal or non-critical lab and imaging results will be communicated to you by [employer], letter or phone within 4 business days after the clinic has received the results. If you do not hear from us within 7 days, please contact the clinic through [employer] or phone. If you have a critical or abnormal lab result, we will notify you by phone as soon as possible.  Submit refill requests through [employer] or call your pharmacy and they will forward the refill request to us. Please allow 3 business days for your refill to be completed.          Additional Information About Your Visit        MyChart Information     [employer] gives you secure access to your electronic health record. If you see a primary care provider, you can also send messages to your care team and make appointments. If you have questions, please call your primary care clinic.  If you do not have a primary care provider, please call [phone] and they will assist you.        Care EveryWhere ID     This is your Care EveryWhere ID. This could be used by other organizations to access your [facility] medical records  [id number]         Blood Pressure from Last 3 Encounters:   05/10/17 110/68   02/08/17 122/82   01/06/17 130/65    Weight from Last 3 Encounters:   05/10/17 88 kg (193 lb 14.4 oz)    02/08/17 88.5 kg (195 lb)   01/06/17 88.5 kg (195 lb 1.7 oz)              We Performed the Following     NEUROMUSCULAR RE-EDUCATION     THERAPEUTIC EXERCISES        Primary Care Provider Office Phone # Fax #    Emery Mallory [phone]       [address] [facility] [address]        Equal Access to Services     [facility] HospitalYULIANA : Hadii aad ku hadasho Soomaali, waaxda luqadaha, qaybta kaalmada adeegyada, waxay idiin hayaan adeeg khaim laSulemanaan ah. So [facility] [phone].    ATENCIÓN: Si junla leona, tiene a moore disposición servicios gratuitos de asistencia lingüística. Teressa al [phone].    We comply with applicable federal civil rights laws and [place] laws. We do not discriminate on the basis of race, color, national origin, age, disability, sex, sexual orientation, or gender identity.            Thank you!     Thank you for choosing [facility]AP[facility]  for your care. Our goal is always to provide you with excellent care. Hearing back from our patients is one way we can continue to improve our services. Please take a few minutes to complete the written survey that you may receive in the mail after your visit with us. Thank you!             Your Updated Medication List - Protect others around you: Learn how to safely use, store and throw away your medicines at www.disposemymeds.org.          This list is accurate as of: 12/13/17  4:46 PM.  Always use your most recent med list.                   Brand Name Dispense Instructions for use Diagnosis    MULTIVITAMINS PO           oxyCODONE IR 5 MG tablet    ROXICODONE    30 tablet    Take 1-2 tablets (5-10 mg) by mouth every 3 hours as needed for pain or other (Moderate to Severe)    Ventral hernia without obstruction or gangrene       sildenafil 100 MG tablet    VIAGRA    12 tablet    Take 0.5-1 tablets ( mg) by mouth daily as needed for erectile dysfunction Take 30 min to 4 hours before  intercourse.  Never use with nitroglycerin, terazosin or doxazosin.    Erectile dysfunction, unspecified erectile dysfunction type       simvastatin 20 MG tablet    ZOCOR    90 tablet    Take 1 tablet (20 mg) by mouth At Bedtime    Hyperlipidemia LDL goal <160

## 2019-02-08 PROBLEM — M54.50 ACUTE BILATERAL LOW BACK PAIN WITHOUT SCIATICA: Status: RESOLVED | Noted: 2017-11-29 | Resolved: 2019-02-08

## 2019-02-08 PROBLEM — M54.2 NECK PAIN ON LEFT SIDE: Status: RESOLVED | Noted: 2017-11-29 | Resolved: 2019-02-08

## 2019-02-08 NOTE — PROGRESS NOTES
Discharge Note    Progress reporting period is from initial eval/last PN to Dec 13, 2017.     Facundo failed to return and current status is unknown.  Please see information below for last relevant information on current status.  Patient seen for 3 visits.  SUBJECTIVE  At last visit patient reported pain was improved but was still constant posterior and left cervical, no longer upper trapezius.  Pain ranged from 1-3/10.  Continue with intermittent brief tingling left UE to the hand.  Has been doing cervical retraction exercises 3x/day, 30-50 reps, thoracic extension 3x/day up to 30 reps and scapular retraction 3x/day.  Losing less than an hour of sleep/night.  Lumbar pain is intemrittent, ranges 0-7/10.  Has not been using lumbar roll.   .  Current pain level is 3/10(cervical ).     Changes in function:  Yes (See Goal flowsheet attached for changes in current functional level)  Adverse reaction to treatment or activity: None    OBJECTIVE  At last visit cervical AROM left rotation 75% with ERP, right rotation 90%, extension 75%.  Trunk extension 66%.      ASSESSMENT/PLAN  Diagnosis: L neck/UT/B LBP   Updated problem list and treatment plan:   Pain - HEP  Decreased ROM/flexibility - HEP  Decreased function - HEP  STG/LTGs have been met or progress has been made towards goals:  Yes, please see goal flowsheet for most current information  Assessment of Progress: current status is unknown.    Last current status: Progressing as expected  Self Management Plans:  HEP  I have re-evaluated this patient and find that the nature, scope, duration and intensity of the therapy is appropriate for the medical condition of the patient.  Facundo continues to require the following intervention to meet STG and LTG's:  HEP.    Recommendations:  Discharge with current home program.  Patient to follow up with MD as needed.    Please refer to the daily flowsheet for treatment today, total treatment time and time spent performing 1:1 timed  codes.

## 2019-10-05 ENCOUNTER — HEALTH MAINTENANCE LETTER (OUTPATIENT)
Age: 69
End: 2019-10-05

## 2020-02-10 ENCOUNTER — HEALTH MAINTENANCE LETTER (OUTPATIENT)
Age: 70
End: 2020-02-10

## 2020-11-14 ENCOUNTER — HEALTH MAINTENANCE LETTER (OUTPATIENT)
Age: 70
End: 2020-11-14

## 2021-04-03 ENCOUNTER — HEALTH MAINTENANCE LETTER (OUTPATIENT)
Age: 71
End: 2021-04-03

## 2021-09-12 ENCOUNTER — HEALTH MAINTENANCE LETTER (OUTPATIENT)
Age: 71
End: 2021-09-12

## 2022-04-24 ENCOUNTER — HEALTH MAINTENANCE LETTER (OUTPATIENT)
Age: 72
End: 2022-04-24

## 2022-11-19 ENCOUNTER — HEALTH MAINTENANCE LETTER (OUTPATIENT)
Age: 72
End: 2022-11-19

## 2023-06-01 ENCOUNTER — HEALTH MAINTENANCE LETTER (OUTPATIENT)
Age: 73
End: 2023-06-01